# Patient Record
Sex: MALE | Race: WHITE | NOT HISPANIC OR LATINO | URBAN - NONMETROPOLITAN AREA
[De-identification: names, ages, dates, MRNs, and addresses within clinical notes are randomized per-mention and may not be internally consistent; named-entity substitution may affect disease eponyms.]

---

## 2017-10-24 ENCOUNTER — APPOINTMENT (RX ONLY)
Dept: URBAN - NONMETROPOLITAN AREA CLINIC 26 | Facility: CLINIC | Age: 57
Setting detail: DERMATOLOGY
End: 2017-10-24

## 2017-10-24 DIAGNOSIS — L13.9 BULLOUS DISORDER, UNSPECIFIED: ICD-10-CM

## 2017-10-24 PROCEDURE — ? OTHER

## 2017-10-24 PROCEDURE — ? ADDITIONAL NOTES

## 2017-10-24 PROCEDURE — 99201: CPT

## 2017-10-24 PROCEDURE — ? PRESCRIPTION

## 2017-10-24 PROCEDURE — ? COUNSELING

## 2017-10-24 RX ORDER — MUPIROCIN 20 MG/G
OINTMENT TOPICAL
Qty: 1 | Refills: 3 | Status: ERX | COMMUNITY
Start: 2017-10-24

## 2017-10-24 RX ORDER — VALACYCLOVIR 1 G/1
TABLET ORAL
Qty: 21 | Refills: 1 | Status: ERX | COMMUNITY
Start: 2017-10-24

## 2017-10-24 RX ADMIN — VALACYCLOVIR: 1 TABLET ORAL at 16:40

## 2017-10-24 RX ADMIN — MUPIROCIN: 20 OINTMENT TOPICAL at 16:55

## 2017-10-24 NOTE — PROCEDURE: ADDITIONAL NOTES
Additional Notes: PATIENT WAS REFERRED DUE TO CONCERN FOR SHINGLES. THERE IS CONCERN WITH ITS DISTRIBUTION INVOLVING BACK OUTSIDE OF ONE ISOLATED DERMATOME. HOWEVER, IT HAS NOT BEEN CULTURED OR SENT FOR PCR TO CONFIRM DIAGNOSIS. \\n\\nGIVEN DISTRIBUTION AND RECURRENT NATURE, SUSPECT THIS IS MORE LIKELY HSV. BULLOUS IMPETIGO ALSO IN DDX. LESS LIKELY ZOSTER. NO ACTIVE BULLOUS LESIONS ON EXAM TODAY BUT SOME SCARRING AND SOME SCABBED. SCABS REMOVED AND SWAB SENT FOR HSV AND VZV PCR.\\n\\nIF HSV, WILL NEED TO START SUPPRESSIVE DOSING OF VALTREX DUE TO FREQUENT OUTBREAKS. \\n\\nIF VZV, NEEDS REFERRAL TO ID OR IMMUNOLOGY TO EVAL POTENTIAL CAUSES OF IMMUNOSUPPRESSION AS DISTRIBUTION AND FREQUENCY OF OUTBREAKS WOULD SUGGEST IMMUNE COMPRMISE. \\n\\nIF NEGATIVE, WILL PLAN TO REPEAT PCR WHEN CURRENT OUTBREAK AS HIGHER YIELD. \\n\\nFOR NOW, CHANGE TO VALTREX FOR LESS FREQUENT DOSING AND BECAUSE GI SE WITH ACYCLOVIR. WILL ALSO START MUPIROCIN OVER AREAS.

## 2017-10-24 NOTE — PROCEDURE: OTHER
Note Text (......Xxx Chief Complaint.): This diagnosis correlates with the
Other (Free Text): Swab taken today
Detail Level: Zone

## 2017-11-22 ENCOUNTER — APPOINTMENT (RX ONLY)
Dept: URBAN - NONMETROPOLITAN AREA CLINIC 26 | Facility: CLINIC | Age: 57
Setting detail: DERMATOLOGY
End: 2017-11-22

## 2017-11-22 DIAGNOSIS — L81.4 OTHER MELANIN HYPERPIGMENTATION: ICD-10-CM

## 2017-11-22 DIAGNOSIS — L10.2 PEMPHIGUS FOLIACEOUS: ICD-10-CM

## 2017-11-22 PROBLEM — J30.1 ALLERGIC RHINITIS DUE TO POLLEN: Status: ACTIVE | Noted: 2017-11-22

## 2017-11-22 PROBLEM — D23.71 OTHER BENIGN NEOPLASM OF SKIN OF RIGHT LOWER LIMB, INCLUDING HIP: Status: ACTIVE | Noted: 2017-11-22

## 2017-11-22 PROBLEM — E03.9 HYPOTHYROIDISM, UNSPECIFIED: Status: ACTIVE | Noted: 2017-11-22

## 2017-11-22 PROBLEM — M12.9 ARTHROPATHY, UNSPECIFIED: Status: ACTIVE | Noted: 2017-11-22

## 2017-11-22 PROBLEM — L30.9 DERMATITIS, UNSPECIFIED: Status: ACTIVE | Noted: 2017-11-22

## 2017-11-22 PROCEDURE — ? COUNSELING

## 2017-11-22 PROCEDURE — 99212 OFFICE O/P EST SF 10 MIN: CPT | Mod: 25

## 2017-11-22 PROCEDURE — ? ADDITIONAL NOTES

## 2017-11-22 PROCEDURE — ? BIOPSY BY PUNCH METHOD

## 2017-11-22 PROCEDURE — 11100: CPT

## 2017-11-22 PROCEDURE — 11101: CPT

## 2017-11-22 ASSESSMENT — LOCATION SIMPLE DESCRIPTION DERM
LOCATION SIMPLE: CHEST
LOCATION SIMPLE: RIGHT UPPER BACK

## 2017-11-22 ASSESSMENT — LOCATION DETAILED DESCRIPTION DERM
LOCATION DETAILED: RIGHT SUPERIOR UPPER BACK
LOCATION DETAILED: LEFT LATERAL SUPERIOR CHEST
LOCATION DETAILED: RIGHT SUPERIOR MEDIAL UPPER BACK

## 2017-11-22 ASSESSMENT — LOCATION ZONE DERM: LOCATION ZONE: TRUNK

## 2017-11-22 NOTE — PROCEDURE: BIOPSY BY PUNCH METHOD
Consent: Written consent was obtained and risks were reviewed including but not limited to scarring, infection, bleeding, scabbing, incomplete removal, nerve damage and allergy to anesthesia.
X Depth Of Punch In Cm (Optional): 0
Anesthesia Volume In Cc: 0.5
Post-Care Instructions: I reviewed with the patient in detail post-care instructions. Patient is to keep the biopsy site dry overnight, and then apply bacitracin twice daily until healed. Patient may apply hydrogen peroxide soaks to remove any crusting.
Render Post-Care Instructions In Note?: no
Dressing: bandage
Biopsy Type: DIF (perilesional)
Billing Type: Third-Party Bill
Notification Instructions: Patient will be notified of biopsy results. However, patient instructed to call the office if not contacted within 2 weeks.
Suture Removal: 14 days
Hemostasis: None
Epidermal Sutures: 4-0 Prolene
Detail Level: Detailed
Punch Size In Mm: 4
Anesthesia Type: 1% lidocaine with epinephrine
Home Suture Removal Text: Patient was provided a home suture removal kit and will remove their sutures at home.  If they have any questions or difficulties they will call the office.
Wound Care: Petrolatum
Biopsy Type: H and E

## 2017-11-22 NOTE — PROCEDURE: ADDITIONAL NOTES
Additional Notes: WORSE TODAY THAN AT LAST VISIT. 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 BIOPSY FROM 1/2 NORMAL AND 1/2 PERILESIONAL FOR H&E DONE AND PERILESIONAL FOR DIF SENT.
Additional Notes: BENIGN LENTIGO. INSTRUCTED TO MONITOR FOR CHANGE IN PIGMENTED LESIONS

## 2018-03-22 ENCOUNTER — APPOINTMENT (RX ONLY)
Dept: URBAN - NONMETROPOLITAN AREA CLINIC 26 | Facility: CLINIC | Age: 58
Setting detail: DERMATOLOGY
End: 2018-03-22

## 2018-03-22 DIAGNOSIS — D485 NEOPLASM OF UNCERTAIN BEHAVIOR OF SKIN: ICD-10-CM

## 2018-03-22 DIAGNOSIS — L90.5 SCAR CONDITIONS AND FIBROSIS OF SKIN: ICD-10-CM

## 2018-03-22 PROBLEM — D48.5 NEOPLASM OF UNCERTAIN BEHAVIOR OF SKIN: Status: ACTIVE | Noted: 2018-03-22

## 2018-03-22 PROCEDURE — ? BIOPSY BY PUNCH METHOD

## 2018-03-22 PROCEDURE — ? TREATMENT REGIMEN

## 2018-03-22 PROCEDURE — ? COUNSELING

## 2018-03-22 PROCEDURE — 11100: CPT

## 2018-03-22 PROCEDURE — 11101: CPT

## 2018-03-22 ASSESSMENT — LOCATION ZONE DERM
LOCATION ZONE: LEG
LOCATION ZONE: TRUNK

## 2018-03-22 ASSESSMENT — LOCATION SIMPLE DESCRIPTION DERM
LOCATION SIMPLE: RIGHT KNEE
LOCATION SIMPLE: RIGHT THIGH
LOCATION SIMPLE: RIGHT UPPER BACK

## 2018-03-22 ASSESSMENT — LOCATION DETAILED DESCRIPTION DERM
LOCATION DETAILED: RIGHT MID-UPPER BACK
LOCATION DETAILED: RIGHT ANTERIOR DISTAL THIGH
LOCATION DETAILED: RIGHT KNEE

## 2018-03-22 NOTE — PROCEDURE: BIOPSY BY PUNCH METHOD
Punch Size In Mm: 4
Anesthesia Volume In Cc: 1
Bill [id number] For Specimen Handling/Conveyance To Laboratory?: no
Size Of Lesion In Cm (Optional): 0
Consent: Written consent was obtained and risks were reviewed including but not limited to scarring, infection, bleeding, scabbing, incomplete removal, nerve damage and allergy to anesthesia.
Billing Type: Third-Party Bill
Dressing: bandage
Wound Care: Petrolatum
Post-Care Instructions: I reviewed with the patient in detail post-care instructions. Patient is to keep the biopsy site dry overnight, and then apply bacitracin twice daily until healed. Patient may apply hydrogen peroxide soaks to remove any crusting.
Suture Removal: 14 days
Anesthesia Type: 1% lidocaine without epinephrine
Notification Instructions: Patient will be notified of biopsy results. However, patient instructed to call the office if not contacted within 2 weeks.
Epidermal Sutures: 4-0 Prolene
Hemostasis: None
Biopsy Type: H and E
Detail Level: Detailed
Home Suture Removal Text: Patient was provided a home suture removal kit and will remove their sutures at home.  If they have any questions or difficulties they will call the office.